# Patient Record
Sex: MALE | Race: BLACK OR AFRICAN AMERICAN | NOT HISPANIC OR LATINO | Employment: FULL TIME | URBAN - METROPOLITAN AREA
[De-identification: names, ages, dates, MRNs, and addresses within clinical notes are randomized per-mention and may not be internally consistent; named-entity substitution may affect disease eponyms.]

---

## 2024-01-15 ENCOUNTER — LAB (OUTPATIENT)
Age: 29
End: 2024-01-15
Payer: COMMERCIAL

## 2024-01-15 ENCOUNTER — OFFICE VISIT (OUTPATIENT)
Age: 29
End: 2024-01-15
Payer: COMMERCIAL

## 2024-01-15 VITALS
SYSTOLIC BLOOD PRESSURE: 132 MMHG | WEIGHT: 160 LBS | HEIGHT: 70 IN | HEART RATE: 64 BPM | OXYGEN SATURATION: 99 % | BODY MASS INDEX: 22.9 KG/M2 | TEMPERATURE: 96.9 F | DIASTOLIC BLOOD PRESSURE: 84 MMHG

## 2024-01-15 DIAGNOSIS — Z11.4 SCREENING FOR HIV (HUMAN IMMUNODEFICIENCY VIRUS): ICD-10-CM

## 2024-01-15 DIAGNOSIS — Z83.2 FAMILY HISTORY OF SICKLE CELL ANEMIA: ICD-10-CM

## 2024-01-15 DIAGNOSIS — E78.5 HYPERLIPIDEMIA, UNSPECIFIED HYPERLIPIDEMIA TYPE: ICD-10-CM

## 2024-01-15 DIAGNOSIS — Z83.2 FAMILY HISTORY OF ANEMIA: Primary | ICD-10-CM

## 2024-01-15 DIAGNOSIS — E78.2 MIXED HYPERLIPIDEMIA: ICD-10-CM

## 2024-01-15 DIAGNOSIS — Z11.59 ENCOUNTER FOR HEPATITIS C SCREENING TEST FOR LOW RISK PATIENT: ICD-10-CM

## 2024-01-15 DIAGNOSIS — Z11.59 SCREENING EXAMINATION FOR POLIOMYELITIS: ICD-10-CM

## 2024-01-15 DIAGNOSIS — Z11.3 SCREENING EXAMINATION FOR VENEREAL DISEASE: ICD-10-CM

## 2024-01-15 DIAGNOSIS — Z00.00 HEALTHCARE MAINTENANCE: Primary | ICD-10-CM

## 2024-01-15 DIAGNOSIS — Z11.3 SCREENING EXAMINATION FOR SEXUALLY TRANSMITTED DISEASE: ICD-10-CM

## 2024-01-15 DIAGNOSIS — Z11.4 SCREENING FOR HUMAN IMMUNODEFICIENCY VIRUS: ICD-10-CM

## 2024-01-15 LAB
ALBUMIN SERPL-MCNC: 4.7 G/DL (ref 3.5–5.2)
ALBUMIN/GLOB SERPL: 1.3 G/DL
ALP SERPL-CCNC: 75 U/L (ref 39–117)
ALT SERPL W P-5'-P-CCNC: 21 U/L (ref 1–41)
ANION GAP SERPL CALCULATED.3IONS-SCNC: 12.6 MMOL/L (ref 5–15)
AST SERPL-CCNC: 32 U/L (ref 1–40)
BASOPHILS # BLD AUTO: 0.04 10*3/MM3 (ref 0–0.2)
BASOPHILS NFR BLD AUTO: 0.9 % (ref 0–1.5)
BILIRUB SERPL-MCNC: 0.5 MG/DL (ref 0–1.2)
BUN SERPL-MCNC: 8 MG/DL (ref 6–20)
BUN/CREAT SERPL: 7.5 (ref 7–25)
CALCIUM SPEC-SCNC: 10.2 MG/DL (ref 8.6–10.5)
CHLORIDE SERPL-SCNC: 101 MMOL/L (ref 98–107)
CHOLEST SERPL-MCNC: 178 MG/DL (ref 0–200)
CO2 SERPL-SCNC: 26.4 MMOL/L (ref 22–29)
CREAT SERPL-MCNC: 1.06 MG/DL (ref 0.76–1.27)
DEPRECATED RDW RBC AUTO: 35.4 FL (ref 37–54)
EGFRCR SERPLBLD CKD-EPI 2021: 96.8 ML/MIN/1.73
EOSINOPHIL # BLD AUTO: 0.28 10*3/MM3 (ref 0–0.4)
EOSINOPHIL NFR BLD AUTO: 6.4 % (ref 0.3–6.2)
ERYTHROCYTE [DISTWIDTH] IN BLOOD BY AUTOMATED COUNT: 11.1 % (ref 12.3–15.4)
GLOBULIN UR ELPH-MCNC: 3.5 GM/DL
GLUCOSE SERPL-MCNC: 78 MG/DL (ref 65–99)
HCT VFR BLD AUTO: 45.2 % (ref 37.5–51)
HDLC SERPL-MCNC: 63 MG/DL (ref 40–60)
HGB BLD-MCNC: 15 G/DL (ref 13–17.7)
IMM GRANULOCYTES # BLD AUTO: 0.01 10*3/MM3 (ref 0–0.05)
IMM GRANULOCYTES NFR BLD AUTO: 0.2 % (ref 0–0.5)
LDLC SERPL CALC-MCNC: 101 MG/DL (ref 0–100)
LDLC/HDLC SERPL: 1.59 {RATIO}
LYMPHOCYTES # BLD AUTO: 2.11 10*3/MM3 (ref 0.7–3.1)
LYMPHOCYTES NFR BLD AUTO: 48.3 % (ref 19.6–45.3)
MCH RBC QN AUTO: 28.8 PG (ref 26.6–33)
MCHC RBC AUTO-ENTMCNC: 33.2 G/DL (ref 31.5–35.7)
MCV RBC AUTO: 86.9 FL (ref 79–97)
MONOCYTES # BLD AUTO: 0.33 10*3/MM3 (ref 0.1–0.9)
MONOCYTES NFR BLD AUTO: 7.6 % (ref 5–12)
NEUTROPHILS NFR BLD AUTO: 1.6 10*3/MM3 (ref 1.7–7)
NEUTROPHILS NFR BLD AUTO: 36.6 % (ref 42.7–76)
NRBC BLD AUTO-RTO: 0 /100 WBC (ref 0–0.2)
PLATELET # BLD AUTO: 212 10*3/MM3 (ref 140–450)
PMV BLD AUTO: 11.5 FL (ref 6–12)
POTASSIUM SERPL-SCNC: 4 MMOL/L (ref 3.5–5.2)
PROT SERPL-MCNC: 8.2 G/DL (ref 6–8.5)
RBC # BLD AUTO: 5.2 10*6/MM3 (ref 4.14–5.8)
SODIUM SERPL-SCNC: 140 MMOL/L (ref 136–145)
TRIGL SERPL-MCNC: 73 MG/DL (ref 0–150)
VLDLC SERPL-MCNC: 14 MG/DL (ref 5–40)
WBC NRBC COR # BLD AUTO: 4.37 10*3/MM3 (ref 3.4–10.8)

## 2024-01-15 PROCEDURE — G0432 EIA HIV-1/HIV-2 SCREEN: HCPCS | Performed by: STUDENT IN AN ORGANIZED HEALTH CARE EDUCATION/TRAINING PROGRAM

## 2024-01-15 PROCEDURE — 99385 PREV VISIT NEW AGE 18-39: CPT | Performed by: STUDENT IN AN ORGANIZED HEALTH CARE EDUCATION/TRAINING PROGRAM

## 2024-01-15 PROCEDURE — 85025 COMPLETE CBC W/AUTO DIFF WBC: CPT | Performed by: STUDENT IN AN ORGANIZED HEALTH CARE EDUCATION/TRAINING PROGRAM

## 2024-01-15 PROCEDURE — 86592 SYPHILIS TEST NON-TREP QUAL: CPT | Performed by: STUDENT IN AN ORGANIZED HEALTH CARE EDUCATION/TRAINING PROGRAM

## 2024-01-15 PROCEDURE — 80074 ACUTE HEPATITIS PANEL: CPT | Performed by: STUDENT IN AN ORGANIZED HEALTH CARE EDUCATION/TRAINING PROGRAM

## 2024-01-15 PROCEDURE — 86706 HEP B SURFACE ANTIBODY: CPT | Performed by: STUDENT IN AN ORGANIZED HEALTH CARE EDUCATION/TRAINING PROGRAM

## 2024-01-15 PROCEDURE — 80061 LIPID PANEL: CPT | Performed by: STUDENT IN AN ORGANIZED HEALTH CARE EDUCATION/TRAINING PROGRAM

## 2024-01-15 PROCEDURE — 36415 COLL VENOUS BLD VENIPUNCTURE: CPT | Performed by: STUDENT IN AN ORGANIZED HEALTH CARE EDUCATION/TRAINING PROGRAM

## 2024-01-15 PROCEDURE — 80053 COMPREHEN METABOLIC PANEL: CPT | Performed by: STUDENT IN AN ORGANIZED HEALTH CARE EDUCATION/TRAINING PROGRAM

## 2024-01-15 NOTE — PROGRESS NOTES
"    Office Note     Name: Katie Lind    : 1995     MRN: [id number]     Chief Complaint  Labs Only (Genotype, hepatitis test, electropholisis ) and Annual Exam    Subjective     History of Present Illness:  Katie Lind is a 28 y.o. male who presents today for initial visit to establish care.  He has no complaints but wants his health screened as he is planning to get  and possibly try to have a child soon. He moved from [facility] to the  about a year ago. He has no prior health issues. He is concerned about getting tested for sickle cell disease as he is unsure of his family history and has concerns about risk of future children.        Past Medical History: History reviewed. No pertinent past medical history.    Past Surgical History: History reviewed. No pertinent surgical history.    Immunizations:   There is no immunization history on file for this patient.     Medications:   No current outpatient medications on file.    Allergies:   No Known Allergies    Family History: History reviewed. No pertinent family history.    Social History:   Social History     Socioeconomic History    Marital status: Single   Tobacco Use    Smoking status: Never    Smokeless tobacco: Never   Substance and Sexual Activity    Alcohol use: Never    Drug use: Never    Sexual activity: Yes     Partners: Female         Objective     Vital Signs  /84   Pulse 64   Temp 96.9 °F (36.1 °C)   Ht 177 cm (69.69\")   Wt 72.6 kg (160 lb)   SpO2 99%   BMI 23.17 kg/m²   Estimated body mass index is 23.17 kg/m² as calculated from the following:    Height as of this encounter: 177 cm (69.69\").    Weight as of this encounter: 72.6 kg (160 lb).    BMI is within normal parameters. No other follow-up for BMI required.      Physical Exam  Constitutional:       General: He is not in acute distress.     Appearance: He is not toxic-appearing.   Cardiovascular:      Rate and Rhythm: Normal rate and regular rhythm.      Heart sounds: " No murmur heard.     No friction rub. No gallop.   Pulmonary:      Effort: Pulmonary effort is normal.      Breath sounds: Normal breath sounds.   Abdominal:      General: Abdomen is flat. There is no distension.   Skin:     General: Skin is warm and dry.   Neurological:      Mental Status: He is alert.   Psychiatric:         Mood and Affect: Mood normal.         Behavior: Behavior normal.          Assessment and Plan     1. Family history of sickle cell anemia  Unclear but possible Fhx of Sickle cell, requests he be tested for risk assessment of future offspring  -Ordered sickle cell screen  - Sickle Cell Screen; Future    2. Mixed hyperlipidemia  Check labs today  - Comprehensive Metabolic Panel; Future  - Lipid Panel; Future  - CBC Auto Differential; Future    3. Screening for HIV (human immunodeficiency virus)  Check HIV  - HIV-1 / O / 2 Ag / Antibody; Future    4. Encounter for hepatitis C screening test for low risk patient  Check hepatitis labs  - Hepatitis C Antibody; Future  - Hepatitis B Surface Antibody; Future  - Hepatitis B Surface Antigen; Future  - Hepatitis B Core Antibody, Total; Future  - Hepatitis B Core Antibody, IgM; Future    5. Screening examination for sexually transmitted disease  Check STI labs  - RPR; Future  - Chlamydia trachomatis, Neisseria gonorrhoeae, PCR - Urine, Urine, Clean Catch; Future    6. Healthcare maintenance  No indication for early cancer screening  Will screen for STI's and hepatitis as above  Will check lipids  Declines flu vaccine      Counseling was given to patient for the following topics: instructions for management.    Follow Up  Return in about 1 year (around 1/15/2025) for Annual physical.    MD [facility]  [address] [address]  [phone]

## 2024-01-16 LAB
C TRACH RRNA SPEC QL NAA+PROBE: NEGATIVE
HBV CORE AB SERPL QL IA: NEGATIVE
HBV CORE IGM SERPL QL IA: NORMAL
HBV SURFACE AB SER RIA-ACNC: REACTIVE
HBV SURFACE AG SERPL QL IA: NORMAL
HCV AB SER DONR QL: NORMAL
HGB S BLD QL SOLY: NEGATIVE
HIV 1+2 AB+HIV1 P24 AG SERPL QL IA: NORMAL
N GONORRHOEA RRNA SPEC QL NAA+PROBE: NEGATIVE
RPR SER QL: NORMAL

## 2024-02-12 ENCOUNTER — TELEPHONE (OUTPATIENT)
Age: 29
End: 2024-02-12
Payer: COMMERCIAL

## 2024-11-02 ENCOUNTER — APPOINTMENT (OUTPATIENT)
Dept: GENERAL RADIOLOGY | Facility: HOSPITAL | Age: 29
End: 2024-11-02
Payer: COMMERCIAL

## 2024-11-02 ENCOUNTER — HOSPITAL ENCOUNTER (EMERGENCY)
Facility: HOSPITAL | Age: 29
Discharge: HOME | End: 2024-11-02
Attending: EMERGENCY MEDICINE
Payer: COMMERCIAL

## 2024-11-02 VITALS
HEIGHT: 72 IN | DIASTOLIC BLOOD PRESSURE: 73 MMHG | TEMPERATURE: 97.4 F | HEART RATE: 73 BPM | BODY MASS INDEX: 22.07 KG/M2 | WEIGHT: 162.92 LBS | OXYGEN SATURATION: 99 % | RESPIRATION RATE: 18 BRPM | SYSTOLIC BLOOD PRESSURE: 158 MMHG
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
## 2024-11-02 DIAGNOSIS — M25.512 ACUTE PAIN OF LEFT SHOULDER: ICD-10-CM

## 2024-11-02 LAB
QT INTERVAL: 394 MS
QTC INTERVAL: 386 MS

## 2024-11-02 PROCEDURE — 93005 ELECTROCARDIOGRAM TRACING: CPT | Performed by: EMERGENCY MEDICINE

## 2024-11-02 PROCEDURE — 99283 EMERGENCY DEPT VISIT LOW MDM: CPT

## 2024-11-02 PROCEDURE — 73030 X-RAY EXAM OF SHOULDER: CPT

## 2024-11-02 PROCEDURE — 71045 X-RAY EXAM CHEST 1 VIEW: CPT

## 2024-11-02 RX ORDER — ASPIRIN 81 MG/1
324 TABLET, CHEWABLE ORAL ONCE
Status: DISCONTINUED | OUTPATIENT
Start: 2024-11-02 | End: 2024-11-02

## 2024-11-02 RX ORDER — SODIUM CHLORIDE 0.9 % (FLUSH) 0.9 %
10 SYRINGE (ML) INJECTION AS NEEDED
Status: DISCONTINUED | OUTPATIENT
Start: 2024-11-02 | End: 2024-11-03 | Stop reason: HOSPADM

## 2024-11-02 RX ORDER — IBUPROFEN 800 MG/1
800 TABLET, FILM COATED ORAL ONCE
Status: COMPLETED | OUTPATIENT
Start: 2024-11-02 | End: 2024-11-02

## 2024-11-02 RX ORDER — ACETAMINOPHEN 500 MG
1000 TABLET ORAL ONCE
Status: COMPLETED | OUTPATIENT
Start: 2024-11-02 | End: 2024-11-02

## 2024-11-02 RX ADMIN — IBUPROFEN 800 MG: 800 TABLET, FILM COATED ORAL at 23:44

## 2024-11-02 RX ADMIN — ACETAMINOPHEN 1000 MG: 500 TABLET ORAL at 23:44
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
## 2024-11-03 NOTE — DISCHARGE INSTRUCTIONS
I recommend you take Tylenol 650 mg every 6 hours and ibuprofen 400 mg every 6 hours as needed for pain unless you have a contraindication to these medications  If you have ongoing shoulder pain I recommend follow-up with an orthopedic physician, and the clinic information for Dr. Foster is included in this paperwork.  Return to the ER as needed for new or worsening symptoms

## 2024-11-05 ENCOUNTER — OFFICE VISIT (OUTPATIENT)
Dept: ORTHOPEDIC SURGERY | Facility: CLINIC | Age: 29
End: 2024-11-05
Payer: COMMERCIAL

## 2024-11-05 VITALS
BODY MASS INDEX: 22.07 KG/M2 | SYSTOLIC BLOOD PRESSURE: 126 MMHG | DIASTOLIC BLOOD PRESSURE: 80 MMHG | WEIGHT: 162.92 LBS | HEIGHT: 72 IN

## 2024-11-05 DIAGNOSIS — S49.92XA INJURY OF LEFT SHOULDER, INITIAL ENCOUNTER: ICD-10-CM

## 2024-11-05 DIAGNOSIS — M25.512 ACUTE PAIN OF LEFT SHOULDER: Primary | ICD-10-CM
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
## 2024-11-06 ENCOUNTER — OFFICE VISIT (OUTPATIENT)
Age: 29
End: 2024-11-06
Payer: COMMERCIAL

## 2024-11-06 ENCOUNTER — HOSPITAL ENCOUNTER (OUTPATIENT)
Dept: CT IMAGING | Facility: HOSPITAL | Age: 29
Discharge: HOME | End: 2024-11-06
Admitting: STUDENT IN AN ORGANIZED HEALTH CARE EDUCATION/TRAINING PROGRAM
Payer: COMMERCIAL

## 2024-11-06 ENCOUNTER — TELEPHONE (OUTPATIENT)
Age: 29
End: 2024-11-06

## 2024-11-06 VITALS
BODY MASS INDEX: 22.26 KG/M2 | OXYGEN SATURATION: 100 % | HEIGHT: 72 IN | RESPIRATION RATE: 18 BRPM | HEART RATE: 74 BPM | WEIGHT: 164.3 LBS | DIASTOLIC BLOOD PRESSURE: 72 MMHG | SYSTOLIC BLOOD PRESSURE: 120 MMHG

## 2024-11-06 DIAGNOSIS — M67.912 TENDINOPATHY OF LEFT ROTATOR CUFF: ICD-10-CM

## 2024-11-06 DIAGNOSIS — S09.90XA TRAUMATIC INJURY OF HEAD, INITIAL ENCOUNTER: Primary | ICD-10-CM

## 2024-11-06 DIAGNOSIS — S09.90XA TRAUMATIC INJURY OF HEAD, INITIAL ENCOUNTER: ICD-10-CM

## 2024-11-06 PROCEDURE — 99214 OFFICE O/P EST MOD 30 MIN: CPT | Performed by: STUDENT IN AN ORGANIZED HEALTH CARE EDUCATION/TRAINING PROGRAM

## 2024-11-06 PROCEDURE — 70450 CT HEAD/BRAIN W/O DYE: CPT

## 2024-11-06 RX ORDER — CYCLOBENZAPRINE HCL 10 MG
10 TABLET ORAL NIGHTLY PRN
Qty: 30 TABLET | Refills: 1 | Status: SHIPPED | OUTPATIENT
Start: 2024-11-06
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
## 2024-11-11 ENCOUNTER — OFFICE VISIT (OUTPATIENT)
Age: 29
End: 2024-11-11
Payer: COMMERCIAL

## 2024-11-11 VITALS
HEIGHT: 72 IN | DIASTOLIC BLOOD PRESSURE: 80 MMHG | BODY MASS INDEX: 23.3 KG/M2 | SYSTOLIC BLOOD PRESSURE: 132 MMHG | HEART RATE: 76 BPM | OXYGEN SATURATION: 98 % | WEIGHT: 172 LBS

## 2024-11-11 DIAGNOSIS — M67.912 TENDINOPATHY OF LEFT ROTATOR CUFF: Primary | ICD-10-CM

## 2024-11-11 PROCEDURE — 99213 OFFICE O/P EST LOW 20 MIN: CPT | Performed by: STUDENT IN AN ORGANIZED HEALTH CARE EDUCATION/TRAINING PROGRAM

## 2024-11-11 RX ORDER — MELOXICAM 7.5 MG/1
7.5 TABLET ORAL DAILY
Qty: 30 TABLET | Refills: 0 | Status: SHIPPED | OUTPATIENT
Start: 2024-11-11
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
## 2024-11-11 NOTE — LETTER
November 11, 2024     Patient: Katie Lind   YOB: 1995   Date of Visit: 11/11/2024       To Whom It May Concern:    It is my medical opinion that Katie Lind may return to work with lifting restriction of no heavy lifting (>10 lbs) for 2 weeks.           Sincerely,        Zach Marroquin MD    CC: No Recipients

## 2024-11-13 ENCOUNTER — OFFICE VISIT (OUTPATIENT)
Age: 29
End: 2024-11-13
Payer: COMMERCIAL

## 2024-11-13 VITALS
DIASTOLIC BLOOD PRESSURE: 66 MMHG | OXYGEN SATURATION: 99 % | BODY MASS INDEX: 22.5 KG/M2 | HEART RATE: 69 BPM | HEIGHT: 72 IN | WEIGHT: 166.1 LBS | RESPIRATION RATE: 18 BRPM | SYSTOLIC BLOOD PRESSURE: 106 MMHG

## 2024-11-13 DIAGNOSIS — M67.912 TENDINOPATHY OF LEFT ROTATOR CUFF: Primary | ICD-10-CM

## 2024-11-13 PROCEDURE — 99213 OFFICE O/P EST LOW 20 MIN: CPT | Performed by: STUDENT IN AN ORGANIZED HEALTH CARE EDUCATION/TRAINING PROGRAM
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
## 2024-11-18 ENCOUNTER — TREATMENT (OUTPATIENT)
Dept: PHYSICAL THERAPY | Facility: CLINIC | Age: 29
End: 2024-11-18
Payer: COMMERCIAL

## 2024-11-18 DIAGNOSIS — R53.1 WEAKNESS: ICD-10-CM

## 2024-11-18 DIAGNOSIS — M75.42 SHOULDER IMPINGEMENT SYNDROME, LEFT: ICD-10-CM

## 2024-11-18 DIAGNOSIS — M25.512 LEFT SHOULDER PAIN, UNSPECIFIED CHRONICITY: Primary | ICD-10-CM

## 2024-11-18 DIAGNOSIS — M25.60 RANGE OF MOTION DEFICIT: ICD-10-CM
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
## 2024-11-22 ENCOUNTER — OFFICE VISIT (OUTPATIENT)
Age: 29
End: 2024-11-22
Payer: COMMERCIAL

## 2024-11-22 VITALS
WEIGHT: 167.6 LBS | SYSTOLIC BLOOD PRESSURE: 122 MMHG | RESPIRATION RATE: 18 BRPM | HEIGHT: 72 IN | BODY MASS INDEX: 22.7 KG/M2 | HEART RATE: 77 BPM | DIASTOLIC BLOOD PRESSURE: 74 MMHG | OXYGEN SATURATION: 99 %

## 2024-11-22 DIAGNOSIS — M67.912 TENDINOPATHY OF LEFT ROTATOR CUFF: Primary | ICD-10-CM

## 2024-11-22 PROCEDURE — 99213 OFFICE O/P EST LOW 20 MIN: CPT | Performed by: STUDENT IN AN ORGANIZED HEALTH CARE EDUCATION/TRAINING PROGRAM
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
## 2024-11-25 ENCOUNTER — TREATMENT (OUTPATIENT)
Dept: PHYSICAL THERAPY | Facility: CLINIC | Age: 29
End: 2024-11-25
Payer: COMMERCIAL

## 2024-11-25 DIAGNOSIS — M25.60 RANGE OF MOTION DEFICIT: ICD-10-CM

## 2024-11-25 DIAGNOSIS — R53.1 WEAKNESS: ICD-10-CM

## 2024-11-25 DIAGNOSIS — M75.42 SHOULDER IMPINGEMENT SYNDROME, LEFT: ICD-10-CM

## 2024-11-25 DIAGNOSIS — M25.512 LEFT SHOULDER PAIN, UNSPECIFIED CHRONICITY: Primary | ICD-10-CM
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
## 2024-11-27 ENCOUNTER — OFFICE VISIT (OUTPATIENT)
Age: 29
End: 2024-11-27
Payer: COMMERCIAL

## 2024-11-27 VITALS
DIASTOLIC BLOOD PRESSURE: 76 MMHG | BODY MASS INDEX: 23.85 KG/M2 | WEIGHT: 176.1 LBS | HEIGHT: 72 IN | OXYGEN SATURATION: 98 % | RESPIRATION RATE: 18 BRPM | HEART RATE: 87 BPM | SYSTOLIC BLOOD PRESSURE: 118 MMHG

## 2024-11-27 DIAGNOSIS — M67.912 TENDINOPATHY OF LEFT ROTATOR CUFF: Primary | ICD-10-CM

## 2024-11-27 PROCEDURE — 99213 OFFICE O/P EST LOW 20 MIN: CPT | Performed by: STUDENT IN AN ORGANIZED HEALTH CARE EDUCATION/TRAINING PROGRAM
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
## 2024-12-03 ENCOUNTER — TREATMENT (OUTPATIENT)
Dept: PHYSICAL THERAPY | Facility: CLINIC | Age: 29
End: 2024-12-03
Payer: COMMERCIAL

## 2024-12-03 DIAGNOSIS — R53.1 WEAKNESS: ICD-10-CM

## 2024-12-03 DIAGNOSIS — M75.42 SHOULDER IMPINGEMENT SYNDROME, LEFT: ICD-10-CM

## 2024-12-03 DIAGNOSIS — M25.512 LEFT SHOULDER PAIN, UNSPECIFIED CHRONICITY: Primary | ICD-10-CM

## 2024-12-03 DIAGNOSIS — M25.60 RANGE OF MOTION DEFICIT: ICD-10-CM
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
## 2024-12-05 ENCOUNTER — OFFICE VISIT (OUTPATIENT)
Age: 29
End: 2024-12-05
Payer: COMMERCIAL

## 2024-12-05 ENCOUNTER — TREATMENT (OUTPATIENT)
Dept: PHYSICAL THERAPY | Facility: CLINIC | Age: 29
End: 2024-12-05
Payer: COMMERCIAL

## 2024-12-05 VITALS
OXYGEN SATURATION: 100 % | RESPIRATION RATE: 18 BRPM | BODY MASS INDEX: 23.35 KG/M2 | SYSTOLIC BLOOD PRESSURE: 124 MMHG | HEIGHT: 72 IN | WEIGHT: 172.4 LBS | HEART RATE: 94 BPM | DIASTOLIC BLOOD PRESSURE: 82 MMHG

## 2024-12-05 DIAGNOSIS — M25.512 LEFT SHOULDER PAIN, UNSPECIFIED CHRONICITY: Primary | ICD-10-CM

## 2024-12-05 DIAGNOSIS — R53.1 WEAKNESS: ICD-10-CM

## 2024-12-05 DIAGNOSIS — M75.42 SHOULDER IMPINGEMENT SYNDROME, LEFT: ICD-10-CM

## 2024-12-05 DIAGNOSIS — M25.60 RANGE OF MOTION DEFICIT: ICD-10-CM

## 2024-12-05 DIAGNOSIS — M67.912 TENDINOPATHY OF LEFT ROTATOR CUFF: Primary | ICD-10-CM

## 2024-12-05 PROCEDURE — 99213 OFFICE O/P EST LOW 20 MIN: CPT | Performed by: STUDENT IN AN ORGANIZED HEALTH CARE EDUCATION/TRAINING PROGRAM

## 2024-12-05 NOTE — PROGRESS NOTES
"    Office Note     Name: Katie Lind    : 1995     MRN: [id number]     Chief Complaint  Pain (L arm pain f/u and paperwork questions )    Subjective     History of Present Illness:  Katie Lind is a 29 y.o. male who presents today for follow up of left arm pain and completion of paperwork.  No change in symptoms but he has not yet been accommodated at work.      Past Medical History: History reviewed. No pertinent past medical history.    Past Surgical History: History reviewed. No pertinent surgical history.    Immunizations:   There is no immunization history on file for this patient.     Medications:     Current Outpatient Medications:   •  cyclobenzaprine (FLEXERIL) 10 MG tablet, Take 1 tablet by mouth At Night As Needed for Muscle Spasms., Disp: 30 tablet, Rfl: 1  •  meloxicam (MOBIC) 7.5 MG tablet, Take 1 tablet by mouth Daily. Do not take any over the counter pain medicines besides tylenol while on this, Disp: 30 tablet, Rfl: 0    Allergies:   No Known Allergies    Family History: History reviewed. No pertinent family history.    Social History:   Social History     Socioeconomic History   • Marital status: Single   Tobacco Use   • Smoking status: Never     Passive exposure: Never   • Smokeless tobacco: Never   Vaping Use   • Vaping status: Never Used   Substance and Sexual Activity   • Alcohol use: Never   • Drug use: Never   • Sexual activity: Yes     Partners: Female         Objective     Vital Signs  /82 (BP Location: Left arm, Patient Position: Sitting, Cuff Size: Large Adult)   Pulse 94   Resp 18   Ht 182.9 cm (72.01\")   Wt 78.2 kg (172 lb 6.4 oz)   SpO2 100%   BMI 23.38 kg/m²   Estimated body mass index is 23.38 kg/m² as calculated from the following:    Height as of this encounter: 182.9 cm (72.01\").    Weight as of this encounter: 78.2 kg (172 lb 6.4 oz).    BMI is within normal parameters. No other follow-up for BMI required.      Physical Exam  Constitutional:       General: " He is not in acute distress.     Appearance: He is not toxic-appearing.   Cardiovascular:      Rate and Rhythm: Normal rate and regular rhythm.      Heart sounds: No murmur heard.     No friction rub. No gallop.   Pulmonary:      Effort: Pulmonary effort is normal.      Breath sounds: Normal breath sounds.   Abdominal:      General: Abdomen is flat. There is no distension.   Skin:     General: Skin is warm and dry.   Neurological:      Mental Status: He is alert.   Psychiatric:         Mood and Affect: Mood normal.         Behavior: Behavior normal.          Assessment and Plan     1. Tendinopathy of left rotator cuff  Subacute stable  Patient needing paperwork  Re-fax [facility] paperwork  Ortho follow up on 12/17       Counseling was given to patient for the following topics: instructions for management.    Follow Up  No follow-ups on file.    MD GOLDIE [facility]  [address] [address]  [phone]
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
## 2024-12-09 ENCOUNTER — TREATMENT (OUTPATIENT)
Dept: PHYSICAL THERAPY | Facility: CLINIC | Age: 29
End: 2024-12-09
Payer: COMMERCIAL

## 2024-12-09 DIAGNOSIS — M25.60 RANGE OF MOTION DEFICIT: ICD-10-CM

## 2024-12-09 DIAGNOSIS — R53.1 WEAKNESS: ICD-10-CM

## 2024-12-09 DIAGNOSIS — M25.512 LEFT SHOULDER PAIN, UNSPECIFIED CHRONICITY: Primary | ICD-10-CM

## 2024-12-09 DIAGNOSIS — M75.42 SHOULDER IMPINGEMENT SYNDROME, LEFT: ICD-10-CM

## 2024-12-09 PROCEDURE — 97035 APP MDLTY 1+ULTRASOUND EA 15: CPT | Performed by: PHYSICAL THERAPIST

## 2024-12-09 PROCEDURE — 97140 MANUAL THERAPY 1/> REGIONS: CPT | Performed by: PHYSICAL THERAPIST

## 2024-12-09 PROCEDURE — 97530 THERAPEUTIC ACTIVITIES: CPT | Performed by: PHYSICAL THERAPIST

## 2024-12-09 PROCEDURE — 97110 THERAPEUTIC EXERCISES: CPT | Performed by: PHYSICAL THERAPIST

## 2024-12-09 NOTE — PROGRESS NOTES
Physical Therapy Daily Treatment Note  [facility] [place]  [address]  [facility] [address]      Patient: Katie Lind   : 1995  Diagnosis/ICD-10 Code:  Left shoulder pain, unspecified chronicity [M25.512]  Referring practitioner: Clary Simpson, *  Date of Initial Visit: Type: THERAPY  Noted: 2024  Today's Date: 2024  Patient seen for 5 sessions         Visit Diagnoses:    ICD-10-CM ICD-9-CM   1. Left shoulder pain, unspecified chronicity  M25.512 719.41   2. Weakness  R53.1 780.79   3. Range of motion deficit  M25.60 719.50   4. Shoulder impingement syndrome, left  M75.42 726.2       Subjective   Katie Lind reports: No change in symptoms, left shoulder feels the same.  Any lifting, even a few pounds causes pain, any reaching out or over head is painful.  Patient indicates that if the activities causes discomfort he does not do them, so there is minimal follow-through on home exercise    .    Objective   OBSERVATION: Posture within normal limits.  PALPATION: Proximal left bicep tendon and subacromial bursa tender.  Minimal tenderness at rotator cuff attachment.  ROM: Left shoulder: Full range of motion in all planes of motion.  Patient reports discomfort with flexion and abduction and external rotation.  STRENGTH: 5/5 for the left shoulder girdle  OTHER: Speed's Test positive, Newby Raúl impingement test positive, Neer's test mildly positive    See Exercise, Manual, and Modality Logs for complete treatment.       Assessment/Plan  Signs and symptoms of bicipital tendinitis and subacromial acromial bursitis.  Pain tolerance is relatively low, if any activity causes even minimal discomfort that activities ceased.  No clinical signs or symptoms of rotator cuff pathology.  Patient may benefit from injection, this may allow him to tolerate the therapeutic exercises and activities he needs to regain a full recovery.  Progress per Plan of Care and Progress  strengthening /stabilization /functional activity  Patient can only come to physical therapy on Mondays.         Timed:         Manual Therapy:    16     mins  99543;     Therapeutic Exercise:    8     mins  49538;     Neuromuscular Jaycob:        mins  91007;    Therapeutic Activity:     15     mins  48050;     Gait Training:           mins  69207;     Ultrasound:     15     mins  56264;    Ionto                                   mins   97105  Self Care                            mins   23610  Canalith Repos         mins 57171      Un-Timed:  Electrical Stimulation:         mins  80084 ( );  Dry Needling          mins self-pay  Traction          mins 08793      Timed Treatment:   54   mins   Total Treatment:     54   mins    Harvey Parada, PT  [place] License: [id number]

## 2024-12-16 ENCOUNTER — TREATMENT (OUTPATIENT)
Dept: PHYSICAL THERAPY | Facility: CLINIC | Age: 29
End: 2024-12-16
Payer: COMMERCIAL

## 2024-12-16 DIAGNOSIS — M25.60 RANGE OF MOTION DEFICIT: ICD-10-CM

## 2024-12-16 DIAGNOSIS — R53.1 WEAKNESS: ICD-10-CM

## 2024-12-16 DIAGNOSIS — M25.512 LEFT SHOULDER PAIN, UNSPECIFIED CHRONICITY: Primary | ICD-10-CM

## 2024-12-16 DIAGNOSIS — M75.42 SHOULDER IMPINGEMENT SYNDROME, LEFT: ICD-10-CM

## 2024-12-16 PROCEDURE — 97530 THERAPEUTIC ACTIVITIES: CPT | Performed by: PHYSICAL THERAPIST

## 2024-12-16 PROCEDURE — 97110 THERAPEUTIC EXERCISES: CPT | Performed by: PHYSICAL THERAPIST

## 2024-12-16 PROCEDURE — 97140 MANUAL THERAPY 1/> REGIONS: CPT | Performed by: PHYSICAL THERAPIST

## 2024-12-16 NOTE — LETTER
Progress Note  12/16/2024  Clary Simpson PA-C    Re: Katie Lind  ________________________________________________________________     Katie Lind, has attended 5 PT sessions.  Treatment has consisted of: AAROM/AROM, scapular stabilization, anterior chest stretching, manual therapy with Jt mob and cross friction massage, US, Estim     S: Katie Lind states: Pain is no longer constant greater than 50% improved. But still will get sharp pain that is debilitating and can last for several minutes at the left anterior/lateral shoulder joint. Patient feels there is no pattern of movement or activities that seem to trigger this.     O:  OBSERVATION: Posture improved, no longer guarding left upper extremity.  PALPATION: Left supraspinatus tendon attachments most tender area, left subacromial bursa and anterior glenohumeral joint moderately tender.  ROM: Full shoulder range of motion all planes of motion.  STRENGTH: Abduction 4+/5 limited by pain.  Supraspinatus 4+/5 limited by pain.  Rest of the shoulder 5/5 throughout.  OTHER: Empty can test positive.  Speed's Test now negative, Newby Raúl impingement test negative, Neer's impingement test positive.     A:   This patient is very fixated on the pain that persist even though there is indication that he is improved significantly. There may be a language barrier issue, patient is very frustrated that he is still experiencing discomfort, even though this discomfort is now intermittent and no longer constant. Signs and symptoms of possible supraspinatus tendinitis and subacromial bursitis. He initially had signs and symptoms of bicipital tendinitis and the symptoms have significantly improved.     P: Please advise after your exam.    Thank you for this referral.    Harvey Parada, PT

## 2024-12-17 ENCOUNTER — OFFICE VISIT (OUTPATIENT)
Dept: ORTHOPEDIC SURGERY | Facility: CLINIC | Age: 29
End: 2024-12-17
Payer: COMMERCIAL

## 2024-12-17 VITALS
BODY MASS INDEX: 22.94 KG/M2 | HEIGHT: 72 IN | SYSTOLIC BLOOD PRESSURE: 132 MMHG | WEIGHT: 169.4 LBS | DIASTOLIC BLOOD PRESSURE: 84 MMHG

## 2024-12-17 DIAGNOSIS — M75.102 ROTATOR CUFF SYNDROME OF LEFT SHOULDER: ICD-10-CM

## 2024-12-17 DIAGNOSIS — M25.512 ACUTE PAIN OF LEFT SHOULDER: Primary | ICD-10-CM
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
## 2024-12-17 DIAGNOSIS — S49.92XD INJURY OF LEFT SHOULDER, SUBSEQUENT ENCOUNTER: ICD-10-CM

## 2024-12-17 PROCEDURE — 99213 OFFICE O/P EST LOW 20 MIN: CPT | Performed by: PHYSICIAN ASSISTANT

## 2024-12-17 NOTE — PROGRESS NOTES
Physical Therapy Daily Treatment Note  [facility] [place]  [address]  [facility] [address]      Patient: Katie Lind   : 1995  Diagnosis/ICD-10 Code:  Left shoulder pain, unspecified chronicity [M25.512]  Referring practitioner: Clary Simpson, *  Date of Initial Visit: Type: THERAPY  Noted: 2024  Today's Date: 2024  Patient seen for 6 sessions         Visit Diagnoses:    ICD-10-CM ICD-9-CM   1. Left shoulder pain, unspecified chronicity  M25.512 719.41   2. Weakness  R53.1 780.79   3. Range of motion deficit  M25.60 719.50   4. Shoulder impingement syndrome, left  M75.42 726.2       Subjective   Katie Lind reports: Pain is no longer constant greater than 50% improved.  But still will get sharp pain that is debilitating and can last for several minutes at the left anterior/lateral shoulder joint.  Patient feels there is no pattern of movement or activities that seem to trigger this.    Objective   OBSERVATION: Posture improved, no longer guarding left upper extremity.  PALPATION: Left supraspinatus tendon attachments most tender area, left subacromial bursa and anterior glenohumeral joint moderately tender.  ROM: Full shoulder range of motion all planes of motion.  STRENGTH: Abduction 4+/5 limited by pain.  Supraspinatus 4+/5 limited by pain.  Rest of the shoulder 5/5 throughout.  OTHER: Empty can test positive.  Speed's Test now negative, Newby Raúl impingement test negative, Neer's impingement test positive.    See Exercise, Manual, and Modality Logs for complete treatment.       Assessment/Plan  This patient is very fixated on the pain that persist even though there is indication that he is improved significantly.  There may be a language barrier issue, patient is very frustrated that he is still experiencing discomfort, even though this discomfort is now intermittent and no longer constant.  Signs and symptoms of possible supraspinatus  tendinitis and subacromial bursitis.  He initially had signs and symptoms of bicipital tendinitis and the symptoms have significantly improved.  Progress per Plan of Care, Progress strengthening /stabilization /functional activity, and Awaiting MD orders           Timed:         Manual Therapy:    15     mins  78808;     Therapeutic Exercise:    23     mins  83869;     Neuromuscular Jaycob:        mins  22418;    Therapeutic Activity:     10     mins  85587;     Gait Training:           mins  82952;     Ultrasound:          mins  00590;    Ionto                                   mins   73032  Self Care                            mins   87117  Canalith Repos         mins 13374      Un-Timed:  Electrical Stimulation:         mins  26746 ( );  Dry Needling          mins self-pay  Traction          mins 37582      Timed Treatment:   48   mins   Total Treatment:     48   mins    Harvey Parada, PT  [place] License: [id number]
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
## 2024-12-20 ENCOUNTER — TELEPHONE (OUTPATIENT)
Dept: ORTHOPEDIC SURGERY | Facility: CLINIC | Age: 29
End: 2024-12-20
Payer: COMMERCIAL

## 2024-12-20 NOTE — TELEPHONE ENCOUNTER
I contact the patient who wanted us to have his FMLA paperwork completed and sent in today. He just provided our office with this paperwork at his appointment yesterday. I explained to him the 15 business day rule to him, however, that was not acceptable.    I informed patient that we will try and get it completed as soon as possible.    FMLA paper had not been assigned yet. I messaged Glenys Shultz and requested that someone attempt to work on it this afternoon.     Angela Guy MA

## 2024-12-20 NOTE — TELEPHONE ENCOUNTER
Caller: Katie Lind    Relationship: Self    Best call back number: [phone]    What is the best time to reach you: ANY     Who are you requesting to speak with (clinical staff, provider,  specific staff member): PROVIDER     Do you know the name of the person who called: YES     What was the call regarding: WANTING TO SPEAK TO THE PROVIDER

## 2024-12-23 ENCOUNTER — TREATMENT (OUTPATIENT)
Dept: PHYSICAL THERAPY | Facility: CLINIC | Age: 29
End: 2024-12-23
Payer: COMMERCIAL

## 2024-12-23 DIAGNOSIS — R53.1 WEAKNESS: ICD-10-CM

## 2024-12-23 DIAGNOSIS — M25.60 RANGE OF MOTION DEFICIT: ICD-10-CM

## 2024-12-23 DIAGNOSIS — M75.42 SHOULDER IMPINGEMENT SYNDROME, LEFT: ICD-10-CM

## 2024-12-23 DIAGNOSIS — M25.512 LEFT SHOULDER PAIN, UNSPECIFIED CHRONICITY: Primary | ICD-10-CM

## 2024-12-23 NOTE — PROGRESS NOTES
Physical Therapy Daily Treatment Note  [facility]AP[facility] [place]  [address], [facility] [address]  [facility] [address]      Patient: Katie Lind   : 1995  Diagnosis/ICD-10 Code:  Left shoulder pain, unspecified chronicity [M25.512]  Referring practitioner: Clary Simpson, *  Date of Initial Visit: Type: THERAPY  Noted: 2024  Today's Date: 2024  Patient seen for 7 sessions         Visit Diagnoses:    ICD-10-CM ICD-9-CM   1. Left shoulder pain, unspecified chronicity  M25.512 719.41   2. Weakness  R53.1 780.79   3. Range of motion deficit  M25.60 719.50   4. Shoulder impingement syndrome, left  M75.42 726.2       Subjective   Katie Lind reports: A MRIs been ordered, but has not been scheduled.  Still reports shoulder pain with movement.  When asked if he is better than when he initially started physical therapy reports he is significantly improved but still having daily sharp discomfort at the anterior and lateral aspect of the left shoulder joint.    Objective   OBSERVATION: Guarding of the left shoulder present today.  PALPATION: Left supraspinatus tendon attachments most tender area, left subacromial bursa and anterior glenohumeral joint moderately tender.  ROM: Full shoulder range of motion all planes of motion.  STRENGTH: Abduction 4+/5 limited by pain.  Supraspinatus 4/5 limited by pain.  Rest of the shoulder 5/5 throughout.  OTHER: Empty can test positive.  Speed's Test now negative, Newby Raúl impingement test negative, Neer's impingement test positive.  See Exercise, Manual, and Modality Logs for complete treatment.       Assessment/Plan  Patient's range of motion is within normal limits now.  This is a significant improvement since she had her initially started rehab.  He still demonstrates frustration with the daily discomfort that is not allowing him to return to work.  MRI seems appropriate for patient condition and reports.  Progress per Plan of Care and Progress  strengthening /stabilization /functional activity           Timed:         Manual Therapy:    15     mins  30342;     Therapeutic Exercise:     26     mins  69302;     Neuromuscular [place]:        mins  78667;    Therapeutic Activity:          mins  02698;     Gait Training:           mins  84727;     Ultrasound:          mins  35189;    Ionto                                   mins   44328  Self Care                            mins   91629  Canalith Repos         mins 10111      Un-Timed:  Electrical Stimulation:    20     mins  01670 ( );  Dry Needling          mins self-pay  Traction          mins 36045      Timed Treatment:   41   mins   Total Treatment:     61   mins    Harvey Parada, PT  [place] License: [id number]